# Patient Record
Sex: MALE | Employment: OTHER | URBAN - METROPOLITAN AREA
[De-identification: names, ages, dates, MRNs, and addresses within clinical notes are randomized per-mention and may not be internally consistent; named-entity substitution may affect disease eponyms.]

---

## 2021-06-25 ENCOUNTER — HOSPITAL ENCOUNTER (EMERGENCY)
Age: 56
Discharge: HOME | End: 2021-06-25
Attending: EMERGENCY MEDICINE
Payer: MEDICARE

## 2021-06-25 ENCOUNTER — APPOINTMENT (OUTPATIENT)
Dept: GENERAL RADIOLOGY | Age: 56
End: 2021-06-25
Payer: MEDICARE

## 2021-06-25 VITALS
BODY MASS INDEX: 28.97 KG/M2 | HEIGHT: 67 IN | TEMPERATURE: 98.6 F | SYSTOLIC BLOOD PRESSURE: 143 MMHG | RESPIRATION RATE: 16 BRPM | OXYGEN SATURATION: 97 % | DIASTOLIC BLOOD PRESSURE: 103 MMHG | WEIGHT: 184.6 LBS | HEART RATE: 62 BPM

## 2021-06-25 DIAGNOSIS — J18.9 COMMUNITY ACQUIRED PNEUMONIA, UNSPECIFIED LATERALITY: Primary | ICD-10-CM

## 2021-06-25 PROCEDURE — 6360000002 HC RX W HCPCS: Performed by: EMERGENCY MEDICINE

## 2021-06-25 PROCEDURE — 71045 X-RAY EXAM CHEST 1 VIEW: CPT

## 2021-06-25 PROCEDURE — 99284 EMERGENCY DEPT VISIT MOD MDM: CPT

## 2021-06-25 PROCEDURE — 6370000000 HC RX 637 (ALT 250 FOR IP): Performed by: EMERGENCY MEDICINE

## 2021-06-25 RX ORDER — PANTOPRAZOLE SODIUM 40 MG/1
TABLET, DELAYED RELEASE ORAL
COMMUNITY
Start: 2021-06-23

## 2021-06-25 RX ORDER — LIDOCAINE 50 MG/G
PATCH TOPICAL
COMMUNITY
Start: 2021-05-06

## 2021-06-25 RX ORDER — ALBUTEROL SULFATE 90 UG/1
2 AEROSOL, METERED RESPIRATORY (INHALATION) EVERY 6 HOURS PRN
COMMUNITY
Start: 2020-09-10 | End: 2021-06-25 | Stop reason: SDUPTHER

## 2021-06-25 RX ORDER — DEXAMETHASONE SODIUM PHOSPHATE 4 MG/ML
8 INJECTION, SOLUTION INTRA-ARTICULAR; INTRALESIONAL; INTRAMUSCULAR; INTRAVENOUS; SOFT TISSUE ONCE
Status: COMPLETED | OUTPATIENT
Start: 2021-06-25 | End: 2021-06-25

## 2021-06-25 RX ORDER — GABAPENTIN 600 MG/1
600 TABLET ORAL 2 TIMES DAILY
COMMUNITY
Start: 2021-02-26

## 2021-06-25 RX ORDER — AZITHROMYCIN 250 MG/1
250 TABLET, FILM COATED ORAL DAILY
Qty: 4 TABLET | Refills: 0 | Status: SHIPPED | OUTPATIENT
Start: 2021-06-25 | End: 2021-06-29

## 2021-06-25 RX ORDER — ALBUTEROL SULFATE 90 UG/1
2 AEROSOL, METERED RESPIRATORY (INHALATION) EVERY 6 HOURS PRN
Qty: 1 INHALER | Refills: 1 | Status: SHIPPED | OUTPATIENT
Start: 2021-06-25

## 2021-06-25 RX ORDER — EPINEPHRINE 0.3 MG/.3ML
0.3 INJECTION SUBCUTANEOUS PRN
COMMUNITY
Start: 2020-10-06

## 2021-06-25 RX ORDER — OXYCODONE HYDROCHLORIDE 60 MG/1
TABLET, FILM COATED, EXTENDED RELEASE ORAL
COMMUNITY
Start: 2021-06-02

## 2021-06-25 RX ORDER — ALLOPURINOL 100 MG/1
TABLET ORAL
COMMUNITY
Start: 2021-03-15

## 2021-06-25 RX ORDER — SERTRALINE HYDROCHLORIDE 100 MG/1
100 TABLET, FILM COATED ORAL DAILY
COMMUNITY
Start: 2021-02-26

## 2021-06-25 RX ORDER — MELOXICAM 7.5 MG/1
7.5 TABLET ORAL DAILY
COMMUNITY

## 2021-06-25 RX ORDER — ALBUTEROL SULFATE 2.5 MG/3ML
5 SOLUTION RESPIRATORY (INHALATION) ONCE
Status: COMPLETED | OUTPATIENT
Start: 2021-06-25 | End: 2021-06-25

## 2021-06-25 RX ORDER — CLOBETASOL PROPIONATE 0.5 MG/G
CREAM TOPICAL 2 TIMES DAILY
COMMUNITY
Start: 2021-05-28

## 2021-06-25 RX ORDER — AZITHROMYCIN 500 MG/1
500 TABLET, FILM COATED ORAL ONCE
Status: COMPLETED | OUTPATIENT
Start: 2021-06-25 | End: 2021-06-25

## 2021-06-25 RX ORDER — OXYCODONE HYDROCHLORIDE 10 MG/1
10 TABLET ORAL 3 TIMES DAILY
COMMUNITY
Start: 2021-06-02

## 2021-06-25 RX ADMIN — AZITHROMYCIN MONOHYDRATE 500 MG: 500 TABLET ORAL at 19:59

## 2021-06-25 RX ADMIN — DEXAMETHASONE SODIUM PHOSPHATE 8 MG: 4 INJECTION, SOLUTION INTRAMUSCULAR; INTRAVENOUS at 19:59

## 2021-06-25 RX ADMIN — ALBUTEROL SULFATE 5 MG: 2.5 SOLUTION RESPIRATORY (INHALATION) at 19:59

## 2021-06-25 ASSESSMENT — PAIN DESCRIPTION - FREQUENCY: FREQUENCY: CONTINUOUS

## 2021-06-25 ASSESSMENT — PAIN SCALES - GENERAL
PAINLEVEL_OUTOF10: 8
PAINLEVEL_OUTOF10: 0

## 2021-06-25 ASSESSMENT — PAIN DESCRIPTION - DESCRIPTORS: DESCRIPTORS: BURNING

## 2021-06-25 ASSESSMENT — PAIN DESCRIPTION - PAIN TYPE: TYPE: ACUTE PAIN

## 2021-06-25 ASSESSMENT — PAIN DESCRIPTION - LOCATION: LOCATION: CHEST

## 2021-06-25 NOTE — ED PROVIDER NOTES
eMERGENCY dEPARTMENT eNCOUnter      [address] [facility]    Chief Complaint   Patient presents with    Cough     c/o productive cough with wheezing x 10 days. He has been using his inhaler 3-4 times per day. MAGED Braun is a 55 y.o. male who presents with cough and wheezing for 10 days. He has known asthma and states he has been using his inhaler several times per day. Is been afebrile with no problems with taste or smell. He has not had Covid over the past year and he is vaccinated. He does not complain of any pain and he does not have any shortness of breath. He is a non-smoker states he has never smoked. He is not been on antibiotic recently. No exacerbating or relieving factors and no other associated signs or symptoms    PAST MEDICAL HISTORY    Past Medical History:   Diagnosis Date    Asthma     Diabetes mellitus ([facility] [address]        SURGICAL HISTORY    Past Surgical History:   Procedure Laterality Date    BACK SURGERY      x 5     COLON SURGERY      HAND SURGERY      right     HERNIA REPAIR      KNEE SURGERY      right knee surgery from [facility]        CURRENT MEDICATIONS    Current Outpatient Rx   Medication Sig Dispense Refill    albuterol sulfate  (90 Base) MCG/ACT inhaler Inhale 2 puffs into the lungs every 6 hours as needed      allopurinol (ZYLOPRIM) 100 MG tablet       pantoprazole (PROTONIX) 40 MG tablet TAKE ONE TABLET DAILY      sertraline (ZOLOFT) 100 MG tablet Take 100 mg by mouth daily      oxyCODONE (OXYCONTIN) 60 MG T12A extended release tablet TAKE 1 TAB BY MOUTH EVERY 12 HOURS.  oxyCODONE HCl (OXY-IR) 10 MG immediate release tablet Take 10 mg by mouth 3 times daily.  metFORMIN (GLUCOPHAGE) 500 MG tablet Take 500 mg by mouth 2 times daily      meloxicam (MOBIC) 7.5 MG tablet Take 7.5 mg by mouth daily      lidocaine (LIDODERM) 5 % APPLY 3 PATCHES TO THE SKIN DAILY. 12 HOURS ON 12 HOURS OFF.       halobetasol (ULTRAVATE) 0.05 % cream Apply topically 2 times daily      gabapentin (NEURONTIN) 600 MG tablet Take 600 mg by mouth 2 times daily.  fluticasone-salmeterol (ADVAIR DISKUS) 250-50 MCG/DOSE AEPB INHALE 1 PUFF INTO THE LUNGS 2 TIMES DAILY.  EPINEPHrine (EPIPEN) 0.3 MG/0.3ML SOAJ injection Inject 0.3 mg into the muscle as needed      clobetasol (TEMOVATE) 0.05 % cream Apply topically 2 times daily      azithromycin (ZITHROMAX) 250 MG tablet Take 1 tablet by mouth daily for 4 days 4 tablet 0    Multiple Vitamin (MULTIVITAMIN PO) Take 1 tablet by mouth daily         ALLERGIES    Allergies   Allergen Reactions    Latex Hives, Rash and Other (See Comments)    Wasp Venom Protein Anaphylaxis    Penicillins Hives and Itching    Cat Hair Extract Other (See Comments)     And Dogs    Food Other (See Comments)    Pollen Extract Other (See Comments)       FAMILY HISTORY    History reviewed. No pertinent family history. SOCIAL HISTORY    Social History     Socioeconomic History    Marital status: Unknown     Spouse name: None    Number of children: None    Years of education: None    Highest education level: None   Occupational History    None   Tobacco Use    Smoking status: Never Smoker    Smokeless tobacco: Never Used   Substance and Sexual Activity    Alcohol use: Never    Drug use: Never    Sexual activity: None   Other Topics Concern    None   Social History Narrative    None     Social Determinants of Health     Financial Resource Strain:     Difficulty of Paying Living Expenses:    Food Insecurity:     Worried About Running Out of Food in the Last Year:     Ran Out of Food in the Last Year:    Transportation Needs:     Lack of Transportation (Medical):      Lack of Transportation (Non-Medical):    Physical Activity:     Days of Exercise per Week:     Minutes of Exercise per Session:    Stress:     Feeling of Stress :    Social Connections:     Frequency of Communication with Friends and Family:     Frequency of Social Gatherings Lymphatic:  No lymphadenopathy noted. Neurologic:  Alert & oriented x 3, Normal motor function, Normal sensory function, No focal deficits noted. Psychiatric:  Affect normal, Judgment normal, Mood normal.     EKG        RADIOLOGY    XR CHEST PORTABLE   Final Result   No radiographic evidence of acute pulmonary disease. PROCEDURES        ED COURSE & MEDICAL DECISION MAKING    Pertinent Labs & Imaging studies reviewed. (See chart for details)  This patient's vital signs are stable he is afebrile with normal oxygenation. He is not tachypneic or tachycardic. He is not toxic or septic appearing. I did a chest x-ray. I gave him a breathing treatment and some Decadron as well. Because symptoms have been lasting for longer than a week and he has other risk factors, I am going to put him on antibiotic, giving him the first dose here in the emergency department. He will be able to be treated as an outpatient. Chest x-ray is clear with no evidence of infiltrate  FINAL IMPRESSION    1.  Community acquired pneumonia, unspecified laterality             Clarisa Handy MD  06/25/21 2000

## 2021-06-26 NOTE — ED NOTES
Patient states, he is feeling a little better. Gave patient discharge instructions. He states, understanding.  Patient discharged to home      Isrrael Raines RN  06/25/21 2045